# Patient Record
Sex: MALE | Race: WHITE | URBAN - METROPOLITAN AREA
[De-identification: names, ages, dates, MRNs, and addresses within clinical notes are randomized per-mention and may not be internally consistent; named-entity substitution may affect disease eponyms.]

---

## 2017-06-14 ENCOUNTER — TELEPHONE (OUTPATIENT)
Dept: FAMILY MEDICINE | Facility: CLINIC | Age: 42
End: 2017-06-14

## 2017-06-14 NOTE — TELEPHONE ENCOUNTER
Lm on vm telling patient needs to follow up with his diabetes with Dr Wegener he is due and to call clinic asap to get that scheduled

## 2017-08-14 ENCOUNTER — OFFICE VISIT (OUTPATIENT)
Dept: FAMILY MEDICINE | Facility: CLINIC | Age: 42
End: 2017-08-14
Payer: COMMERCIAL

## 2017-08-14 ENCOUNTER — HOSPITAL ENCOUNTER (EMERGENCY)
Facility: CLINIC | Age: 42
Discharge: HOME | End: 2017-08-14
Attending: EMERGENCY MEDICINE | Admitting: EMERGENCY MEDICINE
Payer: COMMERCIAL

## 2017-08-14 ENCOUNTER — APPOINTMENT (OUTPATIENT)
Dept: CT IMAGING | Facility: CLINIC | Age: 42
End: 2017-08-14
Attending: EMERGENCY MEDICINE
Payer: COMMERCIAL

## 2017-08-14 VITALS
WEIGHT: 225.25 LBS | TEMPERATURE: 98.6 F | BODY MASS INDEX: 36.25 KG/M2 | SYSTOLIC BLOOD PRESSURE: 136 MMHG | HEART RATE: 66 BPM | OXYGEN SATURATION: 97 % | DIASTOLIC BLOOD PRESSURE: 86 MMHG

## 2017-08-14 VITALS
BODY MASS INDEX: 33.95 KG/M2 | OXYGEN SATURATION: 95 % | SYSTOLIC BLOOD PRESSURE: 151 MMHG | WEIGHT: 224 LBS | TEMPERATURE: 99 F | HEIGHT: 68 IN | DIASTOLIC BLOOD PRESSURE: 85 MMHG | RESPIRATION RATE: 20 BRPM | HEART RATE: 92 BPM

## 2017-08-14 DIAGNOSIS — R10.32 ABDOMINAL PAIN, LEFT LOWER QUADRANT: Primary | ICD-10-CM

## 2017-08-14 DIAGNOSIS — R73.9 HYPERGLYCEMIA: ICD-10-CM

## 2017-08-14 DIAGNOSIS — E66.01 MORBID OBESITY, UNSPECIFIED OBESITY TYPE (H): ICD-10-CM

## 2017-08-14 DIAGNOSIS — D72.829 LEUKOCYTOSIS, UNSPECIFIED TYPE: ICD-10-CM

## 2017-08-14 DIAGNOSIS — R79.89 ELEVATED LACTIC ACID LEVEL: ICD-10-CM

## 2017-08-14 DIAGNOSIS — E11.9 TYPE 2 DIABETES MELLITUS WITHOUT COMPLICATION, WITHOUT LONG-TERM CURRENT USE OF INSULIN (H): ICD-10-CM

## 2017-08-14 DIAGNOSIS — N20.0 KIDNEY STONE: ICD-10-CM

## 2017-08-14 DIAGNOSIS — R31.29 MICROSCOPIC HEMATURIA: ICD-10-CM

## 2017-08-14 DIAGNOSIS — R11.2 NAUSEA AND VOMITING, INTRACTABILITY OF VOMITING NOT SPECIFIED, UNSPECIFIED VOMITING TYPE: ICD-10-CM

## 2017-08-14 LAB
ALBUMIN SERPL-MCNC: 4.1 G/DL (ref 3.4–5)
ALBUMIN UR-MCNC: 30 MG/DL
ALBUMIN UR-MCNC: NEGATIVE MG/DL
ALP SERPL-CCNC: 66 U/L (ref 40–150)
ALT SERPL W P-5'-P-CCNC: 43 U/L (ref 0–70)
ANION GAP SERPL CALCULATED.3IONS-SCNC: 14 MMOL/L (ref 3–14)
APPEARANCE UR: CLEAR
APPEARANCE UR: CLEAR
AST SERPL W P-5'-P-CCNC: 27 U/L (ref 0–45)
BACTERIA #/AREA URNS HPF: ABNORMAL /HPF
BASOPHILS # BLD AUTO: 0 10E9/L (ref 0–0.2)
BASOPHILS # BLD AUTO: 0 10E9/L (ref 0–0.2)
BASOPHILS NFR BLD AUTO: 0.1 %
BASOPHILS NFR BLD AUTO: 0.2 %
BILIRUB SERPL-MCNC: 1.5 MG/DL (ref 0.2–1.3)
BILIRUB UR QL STRIP: NEGATIVE
BILIRUB UR QL STRIP: NEGATIVE
BUN SERPL-MCNC: 17 MG/DL (ref 7–30)
CALCIUM SERPL-MCNC: 9.2 MG/DL (ref 8.5–10.1)
CHLORIDE SERPL-SCNC: 102 MMOL/L (ref 94–109)
CO2 SERPL-SCNC: 20 MMOL/L (ref 20–32)
COLOR UR AUTO: YELLOW
COLOR UR AUTO: YELLOW
CREAT SERPL-MCNC: 1.13 MG/DL (ref 0.66–1.25)
DIFFERENTIAL METHOD BLD: ABNORMAL
DIFFERENTIAL METHOD BLD: ABNORMAL
EOSINOPHIL # BLD AUTO: 0 10E9/L (ref 0–0.7)
EOSINOPHIL # BLD AUTO: 0 10E9/L (ref 0–0.7)
EOSINOPHIL NFR BLD AUTO: 0 %
EOSINOPHIL NFR BLD AUTO: 0.1 %
ERYTHROCYTE [DISTWIDTH] IN BLOOD BY AUTOMATED COUNT: 12.4 % (ref 10–15)
ERYTHROCYTE [DISTWIDTH] IN BLOOD BY AUTOMATED COUNT: 12.7 % (ref 10–15)
GFR SERPL CREATININE-BSD FRML MDRD: 70 ML/MIN/1.7M2
GLUCOSE BLD-MCNC: 226 MG/DL (ref 70–99)
GLUCOSE SERPL-MCNC: 230 MG/DL (ref 70–99)
GLUCOSE UR STRIP-MCNC: 500 MG/DL
GLUCOSE UR STRIP-MCNC: 500 MG/DL
HBA1C MFR BLD: 7.7 % (ref 4.3–6)
HCT VFR BLD AUTO: 44.2 % (ref 40–53)
HCT VFR BLD AUTO: 44.9 % (ref 40–53)
HGB BLD-MCNC: 16.1 G/DL (ref 13.3–17.7)
HGB BLD-MCNC: 16.3 G/DL (ref 13.3–17.7)
HGB UR QL STRIP: ABNORMAL
HGB UR QL STRIP: ABNORMAL
IMM GRANULOCYTES # BLD: 0 10E9/L (ref 0–0.4)
IMM GRANULOCYTES NFR BLD: 0.3 %
KETONES UR STRIP-MCNC: 40 MG/DL
KETONES UR STRIP-MCNC: 80 MG/DL
LACTATE BLD-SCNC: 2.1 MMOL/L (ref 0.7–2.1)
LACTATE BLD-SCNC: 2.6 MMOL/L (ref 0.7–2.1)
LEUKOCYTE ESTERASE UR QL STRIP: NEGATIVE
LEUKOCYTE ESTERASE UR QL STRIP: NEGATIVE
LIPASE SERPL-CCNC: 110 U/L (ref 73–393)
LYMPHOCYTES # BLD AUTO: 0.7 10E9/L (ref 0.8–5.3)
LYMPHOCYTES # BLD AUTO: 0.8 10E9/L (ref 0.8–5.3)
LYMPHOCYTES NFR BLD AUTO: 5.2 %
LYMPHOCYTES NFR BLD AUTO: 5.7 %
MCH RBC QN AUTO: 30.4 PG (ref 26.5–33)
MCH RBC QN AUTO: 31.3 PG (ref 26.5–33)
MCHC RBC AUTO-ENTMCNC: 35.9 G/DL (ref 31.5–36.5)
MCHC RBC AUTO-ENTMCNC: 36.9 G/DL (ref 31.5–36.5)
MCV RBC AUTO: 85 FL (ref 78–100)
MCV RBC AUTO: 85 FL (ref 78–100)
MONOCYTES # BLD AUTO: 0.4 10E9/L (ref 0–1.3)
MONOCYTES # BLD AUTO: 0.5 10E9/L (ref 0–1.3)
MONOCYTES NFR BLD AUTO: 3.1 %
MONOCYTES NFR BLD AUTO: 3.7 %
NEUTROPHILS # BLD AUTO: 11.6 10E9/L (ref 1.6–8.3)
NEUTROPHILS # BLD AUTO: 13.3 10E9/L (ref 1.6–8.3)
NEUTROPHILS NFR BLD AUTO: 90.7 %
NEUTROPHILS NFR BLD AUTO: 90.9 %
NITRATE UR QL: NEGATIVE
NITRATE UR QL: NEGATIVE
NRBC # BLD AUTO: 0 10*3/UL
NRBC BLD AUTO-RTO: 0 /100
PH UR STRIP: 5.5 PH (ref 5–7)
PH UR STRIP: 6 PH (ref 5–7)
PLATELET # BLD AUTO: 134 10E9/L (ref 150–450)
PLATELET # BLD AUTO: 136 10E9/L (ref 150–450)
POTASSIUM SERPL-SCNC: 4.2 MMOL/L (ref 3.4–5.3)
PROT SERPL-MCNC: 8 G/DL (ref 6.8–8.8)
RBC # BLD AUTO: 5.21 10E12/L (ref 4.4–5.9)
RBC # BLD AUTO: 5.3 10E12/L (ref 4.4–5.9)
RBC #/AREA URNS AUTO: ABNORMAL /HPF (ref 0–2)
RBC #/AREA URNS AUTO: ABNORMAL /HPF (ref 0–2)
SODIUM SERPL-SCNC: 136 MMOL/L (ref 133–144)
SP GR UR STRIP: 1.02 (ref 1–1.03)
SP GR UR STRIP: 1.02 (ref 1–1.03)
URN SPEC COLLECT METH UR: ABNORMAL
URN SPEC COLLECT METH UR: ABNORMAL
UROBILINOGEN UR STRIP-ACNC: 0.2 EU/DL (ref 0.2–1)
UROBILINOGEN UR STRIP-ACNC: 0.2 EU/DL (ref 0.2–1)
WBC # BLD AUTO: 12.8 10E9/L (ref 4–11)
WBC # BLD AUTO: 14.7 10E9/L (ref 4–11)
WBC #/AREA URNS AUTO: ABNORMAL /HPF (ref 0–2)
WBC #/AREA URNS AUTO: ABNORMAL /HPF (ref 0–2)

## 2017-08-14 PROCEDURE — 25000128 H RX IP 250 OP 636: Performed by: EMERGENCY MEDICINE

## 2017-08-14 PROCEDURE — 85025 COMPLETE CBC W/AUTO DIFF WBC: CPT | Performed by: EMERGENCY MEDICINE

## 2017-08-14 PROCEDURE — 80053 COMPREHEN METABOLIC PANEL: CPT | Performed by: EMERGENCY MEDICINE

## 2017-08-14 PROCEDURE — 36415 COLL VENOUS BLD VENIPUNCTURE: CPT

## 2017-08-14 PROCEDURE — 87040 BLOOD CULTURE FOR BACTERIA: CPT | Performed by: EMERGENCY MEDICINE

## 2017-08-14 PROCEDURE — 96375 TX/PRO/DX INJ NEW DRUG ADDON: CPT

## 2017-08-14 PROCEDURE — 81001 URINALYSIS AUTO W/SCOPE: CPT | Performed by: FAMILY MEDICINE

## 2017-08-14 PROCEDURE — 99207 ZZC OFFICE-HOSPITAL ADMIT: CPT | Performed by: FAMILY MEDICINE

## 2017-08-14 PROCEDURE — 83690 ASSAY OF LIPASE: CPT | Performed by: EMERGENCY MEDICINE

## 2017-08-14 PROCEDURE — 96374 THER/PROPH/DIAG INJ IV PUSH: CPT

## 2017-08-14 PROCEDURE — 74176 CT ABD & PELVIS W/O CONTRAST: CPT

## 2017-08-14 PROCEDURE — 83605 ASSAY OF LACTIC ACID: CPT | Performed by: EMERGENCY MEDICINE

## 2017-08-14 PROCEDURE — 85025 COMPLETE CBC W/AUTO DIFF WBC: CPT | Performed by: FAMILY MEDICINE

## 2017-08-14 PROCEDURE — 81001 URINALYSIS AUTO W/SCOPE: CPT | Performed by: EMERGENCY MEDICINE

## 2017-08-14 PROCEDURE — 82947 ASSAY GLUCOSE BLOOD QUANT: CPT | Performed by: FAMILY MEDICINE

## 2017-08-14 PROCEDURE — 99285 EMERGENCY DEPT VISIT HI MDM: CPT | Mod: 25

## 2017-08-14 PROCEDURE — 96361 HYDRATE IV INFUSION ADD-ON: CPT

## 2017-08-14 PROCEDURE — 36415 COLL VENOUS BLD VENIPUNCTURE: CPT | Performed by: FAMILY MEDICINE

## 2017-08-14 PROCEDURE — 83036 HEMOGLOBIN GLYCOSYLATED A1C: CPT | Performed by: FAMILY MEDICINE

## 2017-08-14 RX ORDER — TAMSULOSIN HYDROCHLORIDE 0.4 MG/1
0.4 CAPSULE ORAL DAILY
Qty: 7 CAPSULE | Refills: 0 | Status: SHIPPED | OUTPATIENT
Start: 2017-08-14 | End: 2017-08-21

## 2017-08-14 RX ORDER — KETOROLAC TROMETHAMINE 30 MG/ML
30 INJECTION, SOLUTION INTRAMUSCULAR; INTRAVENOUS ONCE
Status: COMPLETED | OUTPATIENT
Start: 2017-08-14 | End: 2017-08-14

## 2017-08-14 RX ORDER — OXYCODONE AND ACETAMINOPHEN 5; 325 MG/1; MG/1
1 TABLET ORAL EVERY 4 HOURS PRN
Qty: 12 TABLET | Refills: 0 | Status: SHIPPED | OUTPATIENT
Start: 2017-08-14 | End: 2017-11-28

## 2017-08-14 RX ORDER — ONDANSETRON 4 MG/1
4 TABLET, ORALLY DISINTEGRATING ORAL EVERY 8 HOURS PRN
Qty: 12 TABLET | Refills: 0 | Status: SHIPPED | OUTPATIENT
Start: 2017-08-14 | End: 2017-08-17

## 2017-08-14 RX ORDER — ONDANSETRON 2 MG/ML
4 INJECTION INTRAMUSCULAR; INTRAVENOUS ONCE
Status: COMPLETED | OUTPATIENT
Start: 2017-08-14 | End: 2017-08-14

## 2017-08-14 RX ORDER — SODIUM CHLORIDE 9 MG/ML
1000 INJECTION, SOLUTION INTRAVENOUS CONTINUOUS
Status: DISCONTINUED | OUTPATIENT
Start: 2017-08-14 | End: 2017-08-15 | Stop reason: HOSPADM

## 2017-08-14 RX ADMIN — SODIUM CHLORIDE 1000 ML: 9 INJECTION, SOLUTION INTRAVENOUS at 19:27

## 2017-08-14 RX ADMIN — ONDANSETRON 4 MG: 2 SOLUTION INTRAMUSCULAR; INTRAVENOUS at 20:55

## 2017-08-14 RX ADMIN — KETOROLAC TROMETHAMINE 30 MG: 30 INJECTION, SOLUTION INTRAMUSCULAR at 21:23

## 2017-08-14 RX ADMIN — SODIUM CHLORIDE 1000 ML: 9 INJECTION, SOLUTION INTRAVENOUS at 20:22

## 2017-08-14 ASSESSMENT — ENCOUNTER SYMPTOMS
ABDOMINAL PAIN: 1
DYSURIA: 0
VOMITING: 1
DIARRHEA: 0
NAUSEA: 1
FEVER: 0
FREQUENCY: 1
HEMATURIA: 0

## 2017-08-14 NOTE — MR AVS SNAPSHOT
"              After Visit Summary   8/14/2017    Sha Lopes    MRN: [id number]           Patient Information     Date Of Birth          1975        Visit Information        Provider Department      8/14/2017 4:40 PM Kesha Larkin MD [facility]        Today's Diagnoses     Abdominal pain, left lower quadrant    -  1    Type 2 diabetes mellitus without complication, without long-term current use of insulin (H)        BMI >35 with comorbidity DM        Hyperglycemia        Nausea and vomiting, intractability of vomiting not specified, unspecified vomiting type        Microscopic hematuria           Follow-ups after your visit        Who to contact     If you have questions or need follow up information about today's clinic visit or your schedule please contact [facility] directly at [phone].  Normal or non-critical lab and imaging results will be communicated to you by MyChart, letter or phone within 4 business days after the clinic has received the results. If you do not hear from us within 7 days, please contact the clinic through MyChart or phone. If you have a critical or abnormal lab result, we will notify you by phone as soon as possible.  Submit refill requests through [email or web address] or call your pharmacy and they will forward the refill request to us. Please allow 3 business days for your refill to be completed.          Additional Information About Your Visit        [employer] Information     [email or web address] lets you send messages to your doctor, view your test results, renew your prescriptions, schedule appointments and more. To sign up, go to [email or web address] . Click on \"Log in\" on the left side of the screen, which will take you to the Welcome page. Then click on \"Sign up Now\" on the right side of the page.     You will be asked to enter the access code listed below, as well as some personal information. Please follow the directions to create your username and " password.     Your access code is: [email or web address]  Expires: 2017  9:56 PM     Your access code will  in 90 days. If you need help or a new code, please call your [place] clinic or [phone].        Care EveryWhere ID     This is your Care EveryWhere ID. This could be used by other organizations to access your [place] medical records  [id number]        Your Vitals Were     Pulse Temperature Pulse Oximetry BMI (Body Mass Index)          66 98.6  F (37  C) (Tympanic) 97% 36.25 kg/m2         Blood Pressure from Last 3 Encounters:   17 151/85   17 136/86   10/14/16 136/88    Weight from Last 3 Encounters:   17 224 lb (101.6 kg)   17 225 lb 4 oz (102.2 kg)   10/14/16 221 lb (100.2 kg)              We Performed the Following     *UA reflex to Microscopic and Culture ([place] and [facility] (except [place] and [place])     CBC with platelets differential     Glucose, whole blood     Hemoglobin A1c     Urine Microscopic        Primary Care Provider Office Phone # Fax #    Joel Daniel Irwin Wegener, [phone] [address] [place] [address]        Equal Access to Services     JL RODRIGUEZ : Hadii aad ku hadasho Soomaali, waaxda luqadaha, qaybta kaalmada adeegyada, waxay idiin hayaan bassam anderson . So [facility] [place] [phone].    ATENCIÓN: Si habla español, tiene a issa disposición servicios gratuitos de asistencia lingüística. ame al [phone].    We comply with applicable federal civil rights laws and [place] laws. We do not discriminate on the basis of race, color, national origin, age, disability sex, sexual orientation or gender identity.            Thank you!     Thank you for choosing [facility]  for your care. Our goal is always to provide you with excellent care. Hearing back from our patients is one way we can continue to improve our services. Please take a few minutes to complete the written survey that you may receive  in the mail after your visit with us. Thank you!             Your Updated Medication List - Protect others around you: Learn how to safely use, store and throw away your medicines at www.disposemymeds.org.          This list is accurate as of: 8/14/17 11:59 PM.  Always use your most recent med list.                   Brand Name Dispense Instructions for use Diagnosis    blood glucose monitoring test strip    no brand specified    100 each    One touch verio test strips Use to test blood sugars two times daily or as directed    Type 2 diabetes mellitus with hyperglycemia, without long-term current use of insulin (H)       lisinopril 5 MG tablet    PRINIVIL/ZESTRIL    90 tablet    Take 1 tablet (5 mg) by mouth daily    Hyperlipidemia LDL goal <100, Type 2 diabetes mellitus with hyperglycemia, without long-term current use of insulin (H)       metFORMIN 1000 MG tablet    GLUCOPHAGE    180 tablet    Take 1 tablet (1,000 mg) by mouth 2 times daily (with meals)    Type 2 diabetes mellitus with hyperglycemia, without long-term current use of insulin (H), Hyperlipidemia LDL goal <100       Multi-vitamin Tabs tablet     100 tablet    Take 1 tablet by mouth daily        ondansetron 4 MG ODT tab    ZOFRAN ODT    12 tablet    Take 1 tablet (4 mg) by mouth every 8 hours as needed for nausea        oxyCODONE-acetaminophen 5-325 MG per tablet    PERCOCET    12 tablet    Take 1 tablet by mouth every 4 hours as needed for pain        pseudoePHEDrine 30 MG tablet    SUDAFED    112 tablet    Take by mouth every 4 hours as needed for congestion        tamsulosin 0.4 MG capsule    FLOMAX    7 capsule    Take 1 capsule (0.4 mg) by mouth daily for 7 doses

## 2017-08-14 NOTE — ED AVS SNAPSHOT
"  Emergency Department    [address] [facility] [address]    Phone:  [phone]    Fax:  [phone]                                       Sha Lopes   MRN: [id number]    Department:   Emergency Department   Date of Visit:  8/14/2017           Patient Information     Date Of Birth          1975        Your diagnoses for this visit were:     Kidney stone     Leukocytosis, unspecified type     Elevated lactic acid level        You were seen by Dk Bryson DO.      Follow-up Information     Follow up with Miguel A Nesbitt MD In 2 days.    Specialty:  Urology    Contact information:    [facility]  [address] [facility] [address]     [facility] [address] [phone]          Follow up with  Emergency Department.    Specialty:  EMERGENCY MEDICINE    Why:  If symptoms worsen    Contact information:    [address] [facility] [address] [phone]        Discharge Instructions          * KIDNEY STONE (w/ Colic)    The sharp cramping pain and nausea/vomiting that you have is due to a small stone which has formed in the kidney and is now passing down a narrow tube (ureter) on its way to your bladder. Once it reaches your bladder, the pain will stop. The stone may pass in your urine stream in one piece. [The size may be 1/16\" to 1/4\" (1-6mm)]. Or, the stone may also break up into rebeca fragments which you may not even notice.  Once you have had a kidney stone there is a risk for recurrence in the future.  HOME CARE:    Drink lots of fluid (at least 8-10 glasses of water a day).    Most stones will pass on their own, but may take from a few hours to a few days.    Each time you urinate, do so in a jar. Pour the urine from the jar through the strainer and into the toilet. Continue doing this until 24 hours after your pain stops. By then, if there was a kidney stone, it should pass from your bladder. Some stones dissolve into sand-like particles and " pass right through the strainer. In that case, you won't ever see a stone.    Save any stone that you find in the strainer and bring it to your doctor for analysis. It may be possible to prevent certain types of stones from forming. Therefore, it is important to know what kind of stone you have.    Try to stay as active as possible since this will help the stone pass. Do not stay in bed unless your pain prevents you from getting up. You may notice a red, pink or brown color to your urine. This is normal while passing a kidney stone.  FOLLOW UP with your doctor or return to this facility if the pain lasts more than 48 hours.  GET PROMPT MEDICAL ATTENTION if any of the following occur:    Pain that is not controlled by the medicine given    Repeated vomiting or unable to keep down fluids    Weakness, dizziness or fainting    Fever over 101  F (38.3  C)    Passage of solid red or brown urine (can't see through it) or urine with lots of blood clots    Unable to pass urine for 8 hours and increasing bladder pressure    [address]38[address] [place] [address]. All rights reserved. This information is not intended as a substitute for professional medical care. Always follow your healthcare professional's instructions.      24 Hour Appointment Hotline       To make an appointment at any [facility] clinic, call [phone] ([phone]). If you don't have a family doctor or clinic, we will help you find one. [facility] clinics are conveniently located to serve the needs of you and your family.             Review of your medicines      START taking        Dose / Directions Last dose taken    oxyCODONE-acetaminophen 5-325 MG per tablet   Commonly known as:  PERCOCET   Dose:  1 tablet   Quantity:  12 tablet        Take 1 tablet by mouth every 4 hours as needed for pain   Refills:  0        tamsulosin 0.4 MG capsule   Commonly known as:  FLOMAX   Dose:  0.4 mg   Quantity:  7 capsule        Take 1  capsule (0.4 mg) by mouth daily for 7 doses   Refills:  0          Our records show that you are taking the medicines listed below. If these are incorrect, please call your family doctor or clinic.        Dose / Directions Last dose taken    blood glucose monitoring test strip   Commonly known as:  no brand specified   Quantity:  100 each        One touch verio test strips Use to test blood sugars two times daily or as directed   Refills:  11        lisinopril 5 MG tablet   Commonly known as:  PRINIVIL/ZESTRIL   Dose:  5 mg   Quantity:  90 tablet        Take 1 tablet (5 mg) by mouth daily   Refills:  3        metFORMIN 1000 MG tablet   Commonly known as:  GLUCOPHAGE   Dose:  1000 mg   Quantity:  180 tablet        Take 1 tablet (1,000 mg) by mouth 2 times daily (with meals)   Refills:  3        Multi-vitamin Tabs tablet   Dose:  1 tablet   Quantity:  100 tablet        Take 1 tablet by mouth daily   Refills:  3        pseudoePHEDrine 30 MG tablet   Commonly known as:  SUDAFED   Quantity:  112 tablet        Take by mouth every 4 hours as needed for congestion   Refills:  0                Prescriptions were sent or printed at these locations (2 Prescriptions)                   Other Prescriptions                Printed at Department/Unit printer (2 of 2)         oxyCODONE-acetaminophen (PERCOCET) 5-325 MG per tablet               tamsulosin (FLOMAX) 0.4 MG capsule                Procedures and tests performed during your visit     Procedure/Test Number of Times Performed    *UA reflex to Microscopic (ED Lab POCT Only 3-11) 1    Blood culture 2    CBC with platelets differential 1    CT Abdomen Pelvis w/o Contrast (stone protocol) 1    Comprehensive metabolic panel 1    Lactic acid 1    Lactic acid whole blood 1    Lipase 1    Urine Microscopic 1      Orders Needing Specimen Collection     None      Pending Results     Date and Time Order Name Status Description    8/14/2017 1955 Blood culture In process     8/14/2017  1955 Blood culture In process             Pending Culture Results     Date and Time Order Name Status Description    8/14/2017 1955 Blood culture In process     8/14/2017 1955 Blood culture In process             Pending Results Instructions     If you had any lab results that were not finalized at the time of your Discharge, you can call the ED Lab Result RN at [phone]. You will be contacted by this team for any positive Lab results or changes in treatment. The nurses are available 7 days a week from 10A to 6:30P.  You can leave a message 24 hours per day and they will return your call.        Test Results From Your Hospital Stay        8/14/2017  7:29 PM      Component Results     Component Value Ref Range & Units Status    WBC 14.7 (H) 4.0 - 11.0 10e9/L Final    RBC Count 5.21 4.4 - 5.9 10e12/L Final    Hemoglobin 16.3 13.3 - 17.7 g/dL Final    Hematocrit 44.2 40.0 - 53.0 % Final    MCV 85 78 - 100 fl Final    MCH 31.3 26.5 - 33.0 pg Final    MCHC 36.9 (H) 31.5 - 36.5 g/dL Final    RDW 12.4 10.0 - 15.0 % Final    Platelet Count 136 (L) 150 - 450 10e9/L Final    Diff Method Automated Method  Final    % Neutrophils 90.7 % Final    % Lymphocytes 5.2 % Final    % Monocytes 3.7 % Final    % Eosinophils 0.0 % Final    % Basophils 0.1 % Final    % Immature Granulocytes 0.3 % Final    Nucleated RBCs 0 0 /100 Final    Absolute Neutrophil 13.3 (H) 1.6 - 8.3 10e9/L Final    Absolute Lymphocytes 0.8 0.8 - 5.3 10e9/L Final    Absolute Monocytes 0.5 0.0 - 1.3 10e9/L Final    Absolute Eosinophils 0.0 0.0 - 0.7 10e9/L Final    Absolute Basophils 0.0 0.0 - 0.2 10e9/L Final    Abs Immature Granulocytes 0.0 0 - 0.4 10e9/L Final    Absolute Nucleated RBC 0.0  Final         8/14/2017  7:58 PM      Component Results     Component Value Ref Range & Units Status    Sodium 136 133 - 144 mmol/L Final    Potassium 4.2 3.4 - 5.3 mmol/L Final    Chloride 102 94 - 109 mmol/L Final    Carbon Dioxide 20 20 - 32 mmol/L Final    Anion Gap 14  3 - 14 mmol/L Final    Glucose 230 (H) 70 - 99 mg/dL Final    Urea Nitrogen 17 7 - 30 mg/dL Final    Creatinine 1.13 0.66 - 1.25 mg/dL Final    GFR Estimate 70 >60 mL/min/1.7m2 Final    Non  GFR Calc    GFR Estimate If Black 85 >60 mL/min/1.7m2 Final    African American GFR Calc    Calcium 9.2 8.5 - 10.1 mg/dL Final    Bilirubin Total 1.5 (H) 0.2 - 1.3 mg/dL Final    Albumin 4.1 3.4 - 5.0 g/dL Final    Protein Total 8.0 6.8 - 8.8 g/dL Final    Alkaline Phosphatase 66 40 - 150 U/L Final    ALT 43 0 - 70 U/L Final    AST 27 0 - 45 U/L Final         8/14/2017  7:43 PM      Component Results     Component Value Ref Range & Units Status    Lipase 110 73 - 393 U/L Final         8/14/2017  7:36 PM      Component Results     Component Value Ref Range & Units Status    Lactic Acid 2.6 (H) 0.7 - 2.1 mmol/L Final         8/14/2017  8:05 PM      Component Results     Component Value Ref Range & Units Status    Color Urine Yellow  Final    Appearance Urine Clear  Final    Glucose Urine 500 (A) NEG mg/dL Final    Bilirubin Urine Negative NEG Final    Ketones Urine 80 (A) NEG mg/dL Final    Specific Gravity Urine 1.025 1.003 - 1.035 Final    Blood Urine Large (A) NEG Final    pH Urine 5.5 5.0 - 7.0 pH Final    Protein Albumin Urine Negative NEG mg/dL Final    Urobilinogen Urine 0.2 0.2 - 1.0 EU/dL Final    Nitrite Urine Negative NEG Final    Leukocyte Esterase Urine Negative NEG Final    Source Midstream Urine  Final         8/14/2017  8:33 PM         8/14/2017  8:31 PM         8/14/2017  8:05 PM      Component Results     Component Value Ref Range & Units Status    WBC Urine O - 2 0 - 2 /HPF Final    RBC Urine 5-10 (A) 0 - 2 /HPF Final         8/14/2017  8:51 PM      Narrative     Exam: CT ABDOMEN PELVIS W/O CONTRAST  8/14/2017 8:44 PM    History: Abdominal or flank pain, possible stone.    Comparison: None    Technique: Volumetric acquisition with reconstruction in the axial,  coronal planes through the abdomen  and pelvis without contrast.  Radiation dose for this scan was reduced using automated exposure  control, adjustment of the mA and/or kV according to patient size, or  iterative reconstruction technique.    Contrast: None    Findings:   Lung Bases: Mild dependent atelectasis. No pleural or pericardial  effusion.    Abdomen: Unenhanced liver, spleen, adrenal glands, pancreas are  normal. Layering sludge in the gallbladder without gallbladder wall  thickening or biliary dilatation.    Normal appearance of the right kidney without renal or ureteral  stones, no hydronephrosis or hydroureter. Left side demonstrates mild  to moderate hydronephrosis and hydroureter. There is a 2 mm  calcification that appears to be within the urinary bladder, but may  be within a ureterocele as there is hypodensity extending from the  ureterovesical junction to the level of the stone. Additionally, there  is a 5 mm stone in the distal left ureter, approximately 1-2 cm from  the ureterovesical junction.    No areas of bowel wall thickening or bowel dilatation. Scattered  colonic diverticula without signs of diverticulitis. Normal appendix.  No free fluid. No abdominal or pelvic lymphadenopathy.    Bones: No concerning lytic or sclerotic lesions.        Impression     Impression: 2 mm stone likely within a left ureterocele. Additional 5  mm stone in the distal left ureter approximately 1-2 cm from the  ureterovesical junction. There is mild to moderate associated upstream  hydronephrosis and hydroureter.    CORINNA COSTA         8/14/2017  9:38 PM      Component Results     Component Value Ref Range & Units Status    Lactic Acid 2.1 0.7 - 2.1 mmol/L Final                Clinical Quality Measure: Blood Pressure Screening     Your blood pressure was checked while you were in the emergency department today. The last reading we obtained was  BP: (!) 169/96 . Please read the guidelines below about what these numbers mean and what you should do  "about them.  If your systolic blood pressure (the top number) is less than 120 and your diastolic blood pressure (the bottom number) is less than 80, then your blood pressure is normal. There is nothing more that you need to do about it.  If your systolic blood pressure (the top number) is 120-139 or your diastolic blood pressure (the bottom number) is 80-89, your blood pressure may be higher than it should be. You should have your blood pressure rechecked within a year by a primary care provider.  If your systolic blood pressure (the top number) is 140 or greater or your diastolic blood pressure (the bottom number) is 90 or greater, you may have high blood pressure. High blood pressure is treatable, but if left untreated over time it can put you at risk for heart attack, stroke, or kidney failure. You should have your blood pressure rechecked by a primary care provider within the next 4 weeks.  If your provider in the emergency department today gave you specific instructions to follow-up with your doctor or provider even sooner than that, you should follow that instruction and not wait for up to 4 weeks for your follow-up visit.        Thank you for choosing [facility]       Thank you for choosing [facility] for your care. Our goal is always to provide you with excellent care. Hearing back from our patients is one way we can continue to improve our services. Please take a few minutes to complete the written survey that you may receive in the mail after you visit with us. Thank you!        [employer] Information     [employer] lets you send messages to your doctor, view your test results, renew your prescriptions, schedule appointments and more. To sign up, go to [email or web address] . Click on \"Log in\" on the left side of the screen, which will take you to the Welcome page. Then click on \"Sign up Now\" on the right side of the page.     You will be asked to enter the access code listed below, as well as some personal " information. Please follow the directions to create your username and password.     Your access code is: [email or web address]  Expires: 2017  9:56 PM     Your access code will  in 90 days. If you need help or a new code, please call your [facility] clinic or [phone].        Care EveryWhere ID     This is your Care EveryWhere ID. This could be used by other organizations to access your [facility] medical records  [id number]        Equal Access to Services     [facility]red HospitalJOSAFAT : Angelina lino [place], waaxda luqadaha, qaybta kaalmada adejessy, prachi anderson . So [facility] [phone].    ATENCIÓN: Si habla español, tiene a issa disposición servicios gratuitos de asistencia lingüística. Llame al [phone].    We comply with applicable federal civil rights laws and [place] laws. We do not discriminate on the basis of race, color, national origin, age, disability sex, sexual orientation or gender identity.            After Visit Summary       This is your record. Keep this with you and show to your community pharmacist(s) and doctor(s) at your next visit.

## 2017-08-14 NOTE — NURSING NOTE
Writer called [facility] ER per Dr. Larkin's request. Dr. Larkin sending patient to ER for further evaluation. Writer updated ER to patient current condition. Patient with DMII, has not taken Metformin for one month. Patient with LLQ pain, Nausea and vomiting, Hematuria, WBC, RBC and ketones in urine as well. Vitals WNL. Glucose level processing but Hyperglycemia suspected.   ER staff updated to all information and patient being driven to ER by family/friend.     Thanks! Lennie Carbone RN

## 2017-08-14 NOTE — PROGRESS NOTES
SUBJECTIVE:                                                    Sha Lopes is a 42 year old male who presents to clinic today for the following health issues:    ABDOMINAL   PAIN     Onset: today     Description:   Character: Gnawing pain, eases off but comes back after urinating   Location: left lower quadrant  Radiation: None     Intensity: severe    Progression of Symptoms:  worsening    Accompanying Signs & Symptoms:  Fever/Chills?: YES- chills and warm  Gas/Bloating: no   Nausea: YES  Vomitting: YES  Diarrhea?: no   Constipation:no       History:   Trauma: no   Previous similar pain: YES- long ago    Previous tests done: x-ray    Precipitating factors:   Does the pain change with:     Food: no      BM: no     Urination: YES    Alleviating factors:  none    Therapies Tried and outcome: metformin and aspirin outcome; not effective     LMP:  not applicable     Genitourinary - Male  Onset: today     Description:   Dysuria (painful urination): YES  Hematuria (blood in urine): no   Frequency: YES  Are you urinating at night : YES- 1x  Hesitancy (delay in urine): no   Retention (unable to empty): no   Decrease in urinary flow: no   Incontinence: no     Progression of Symptoms:  worsening    Accompanying Signs & Symptoms:     Back/Flank pain: no   Urethral discharge: no   Testicle lumps/masses/pain: no           History:   History of frequent UTI's: no   History of kidney stones: no   History of hernias: no   Personal or Family history of Prostate problems: no  Sexually active: no     Precipitating factors:   none    Alleviating factors:  none        He complains of pain in his left lower quad when he urinates.     He last checked his sugars months ago, His last dose of Metformin was today. It was sitting there and he noticed it and decided to take it.     He has been taking Metformin, but he has missed it many times. He has only taken it 4-5 times in the last few weeks. He has been really stressed with a  recent move back here entirely. He finished a new masters and started a new job. He has been stress eating and not paying attention to his diet.    When he woke up this morning he complained of abdominal pain     He will urinate once in the night. If he hloed his urine in he will get some lower left abdominal discomfort that resolves with urination.    Today, the left lower abdominal pain persisted and got progressively worse while he was at work. He will urinate and it had led to pain centered in the lower left side. When he stops urinating it radiates out into surrounding areas and the contracts back in.     He has had nausea, chills, hot flashes. He then vomited and urinated which causes the worst pain.     In the clinic, he is still in some pain.     No increased hunger or thirst. No leg swelling     He has had hematuria in the past once.     He had a recent stress test which came back negative.       Problem list and histories reviewed & adjusted, as indicated.  Additional history: as documented    Patient Active Problem List   Diagnosis     Family history of diabetes mellitus     Type 2 diabetes, HbA1c goal < 7% (H)     BMI >35 with comorbidity DM     History reviewed. No pertinent surgical history.    Social History   Substance Use Topics     Smoking status: Never Smoker     Smokeless tobacco: Never Used     Alcohol use No     History reviewed. No pertinent family history.      Current Outpatient Prescriptions   Medication Sig Dispense Refill     metFORMIN (GLUCOPHAGE) 1000 MG tablet Take 1 tablet (1,000 mg) by mouth 2 times daily (with meals) 180 tablet 3     blood glucose monitoring (NO BRAND SPECIFIED) test strip One touch verio test strips Use to test blood sugars two times daily or as directed 100 each 11     multivitamin, therapeutic with minerals (MULTI-VITAMIN) TABS Take 1 tablet by mouth daily 100 tablet 3     lisinopril (PRINIVIL,ZESTRIL) 5 MG tablet Take 1 tablet (5 mg) by mouth daily (Patient not  taking: Reported on 8/14/2017) 90 tablet 3     pseudoePHEDrine (SUDAFED) 30 MG tablet Take by mouth every 4 hours as needed for congestion 112 tablet      No Known Allergies  Recent Labs   Lab Test  08/14/17   1645  10/14/16   1056  02/03/14   1052  01/29/14   1109   A1C  7.7*  7.4*   --   7.7*   LDL   --   105*  117   --    HDL   --   43  38*   --    TRIG   --   215*  205*   --    ALT   --   29   --    --    CR   --   0.96   --    --    GFRESTIMATED   --   85   --    --    GFRESTBLACK   --   >90   GFR Calc     --    --    POTASSIUM   --   3.6   --    --    TSH   --   0.57   --    --       BP Readings from Last 3 Encounters:   08/14/17 136/86   10/14/16 136/88   02/03/14 128/88    Wt Readings from Last 3 Encounters:   08/14/17 225 lb 4 oz (102.2 kg)   10/14/16 221 lb (100.2 kg)   02/03/14 223 lb 6.4 oz (101.3 kg)           Reviewed and updated as needed this visit by clinical staff  Reviewed and updated as needed this visit by Provider    ROS:  See above     This document serves as a record of the services and decisions personally performed and made by Kesha Larkin MD. It was created on her behalf by Olive Centeno, a trained medical scribe. The creation of this document is based the provider's statements to the medical scribes.    Scribe Olive Centeno, August 14, 2017    OBJECTIVE:     /86 (Cuff Size: Adult Large)  Pulse 66  Temp 98.6  F (37  C) (Tympanic)  Wt 225 lb 4 oz (102.2 kg)  SpO2 97%  BMI 36.25 kg/m2  Body mass index is 36.25 kg/(m^2).  GENERAL: alert and no distress  EYES: Eyes grossly normal to inspection, PERRL and conjunctivae and sclerae normal  HENT: ear canals and TM's normal, nose and mouth without ulcers or lesions  NECK: no adenopathy, no asymmetry, masses  RESP: lungs clear to auscultation - no rales, rhonchi or wheezes  CV: regular rate and rhythm, normal S1 S2, no S3 or S4, no murmur, click or rub  ABDOMEN: soft, mildly ttp LLQ, no  hepatosplenomegaly, no masses and bowel sounds normal  MS: no gross musculoskeletal defects noted, no edema  SKIN: no suspicious lesions or rashes  NEURO: Normal strength and tone, mentation intact and speech normal  BACK: no CVA tenderness, no paralumbar tenderness  PSYCH: mentation appears normal, affect normal/bright  LYMPH: no cervical, supraclavicular, axillary, or inguinal adenopathy    Diagnostic Test Results:  Results for orders placed or performed in visit on 08/14/17   *UA reflex to Microscopic and Culture ([place] and [facility] (except [place] and [place])   Result Value Ref Range    Color Urine Yellow     Appearance Urine Clear     Glucose Urine 500 (A) NEG mg/dL    Bilirubin Urine Negative NEG    Ketones Urine 40 (A) NEG mg/dL    Specific Gravity Urine 1.020 1.003 - 1.035    Blood Urine Large (A) NEG    pH Urine 6.0 5.0 - 7.0 pH    Protein Albumin Urine 30 (A) NEG mg/dL    Urobilinogen Urine 0.2 0.2 - 1.0 EU/dL    Nitrite Urine Negative NEG    Leukocyte Esterase Urine Negative NEG    Source Midstream Urine    Hemoglobin A1c   Result Value Ref Range    Hemoglobin A1C 7.7 (H) 4.3 - 6.0 %   Urine Microscopic   Result Value Ref Range    WBC Urine Pending 0 - 2 /HPF    RBC Urine 25-50 (A) 0 - 2 /HPF    Bacteria Urine Few (A) NEG /HPF         ASSESSMENT/PLAN:     41yo M with type II DM who has not been taking his metformin for the past month here with recent polyuria and with LLQ/left flank pain particularly with urination, nausea, vomiting x 1 day. UA was remarkable for glucose 500mg/dL, ketones, protein and blood. I am concerned that his symptoms may be secondary to significant hyperglycemia with mild DKA, though he is a type II diabetic.  In addition given flank pain, hematuria and nausea/vomiting, nephrolithiasis is a possibility.   A1c is above goal at 7.7, but not significantly uncontrolled. BG elevated by meter today at 226. White count is mildly elevated as well. VSS. I have recommended he go  to the ER for further evaluation. Our RN has called [facility] ER to let them know he is coming. He has someone with him today who will be driving him to the ER from clinic.          The information in this document, created by the medical scribe for me, accurately reflects the services I personally performed and the decisions made by me. I have reviewed and approved this document for accuracy. 08/14/17    Kesha Larkin MD  [facility]

## 2017-08-14 NOTE — ED AVS SNAPSHOT
Emergency Department    [address] [place] [id number]    Phone:  [phone]    Fax:  [phone]                                       Sha Lopes   MRN: [id number]    Department:   Emergency Department   Date of Visit:  8/14/2017           After Visit Summary Signature Page     I have received my discharge instructions, and my questions have been answered. I have discussed any challenges I see with this plan with the nurse or doctor.    ..........................................................................................................................................  Patient/Patient Representative Signature      ..........................................................................................................................................  Patient Representative Print Name and Relationship to Patient    ..................................................               ................................................  Date                                            Time    ..........................................................................................................................................  Reviewed by Signature/Title    ...................................................              ..............................................  Date                                                            Time

## 2017-08-14 NOTE — NURSING NOTE
"Chief Complaint   Patient presents with     Abdominal Pain       Initial /86 (Cuff Size: Adult Large)  Pulse 66  Temp 98.6  F (37  C) (Tympanic)  Wt 225 lb 4 oz (102.2 kg)  SpO2 97%  BMI 36.25 kg/m2 Estimated body mass index is 36.25 kg/(m^2) as calculated from the following:    Height as of 10/14/16: 5' 6.1\" (1.679 m).    Weight as of this encounter: 225 lb 4 oz (102.2 kg).  Medication Reconciliation: complete     Leila Hunter, JUNE      "

## 2017-08-15 NOTE — ED PROVIDER NOTES
"  History     Chief Complaint:  Abdominal pain    HPI  Sha Lopes is a 42 year old male with a history of diabetes who presents with abdominal pain. The patient began experiencing left lower quadrant pain that worsened while urinating earlier this morning after waking up. He also notes some associated nausea and vomiting. He states waking up with slight abdominal pain and the need to urinate has not been uncommon the last month, but the fact that this has not alleviated is new. He has not been able to eat or drink much today at all. He was seen in clinic for this concern this morning where his blood sugar was read and noted to be high. He was sent here to the ED for further evaluation of this abdominal pain. The patient has no history of UTI or kidney stones.     Allergies:  No Known Drug Allergies    Medications:    Metformin  Prinivil  Sudafed    Past Medical History:    Diabetes    Past Surgical History:    History reviewed. No pertinent past surgical history.    Family History:    The patient denies any relevant family medical history.    Social History:  The patient was accompanied to the ED by his wife.  Smoking Status: No  Smokeless Tobacco: No  Alcohol Use: No  Marital Status:  Single     Review of Systems   Constitutional: Negative for fever.   Gastrointestinal: Positive for abdominal pain (LLQ), nausea and vomiting. Negative for diarrhea.   Genitourinary: Positive for frequency. Negative for dysuria and hematuria.   All other systems reviewed and are negative.    Physical Exam   Vitals:  Patient Vitals for the past 24 hrs:   BP Temp Temp src Pulse Resp SpO2 Height Weight   08/14/17 2200 151/85 - - 92 20 95 % - -   08/14/17 2100 - - - 95 20 96 % - -   08/14/17 2030 (!) 169/96 - - 83 18 94 % - -   08/14/17 2000 (!) 160/100 - - 92 20 97 % - -   08/14/17 1929 (!) 137/98 - - 83 18 94 % - -   08/14/17 1853 (!) 181/105 99  F (37.2  C) Oral 82 18 96 % 1.727 m (5' 8\") 101.6 kg (224 lb)     Physical " Exam  General:  Sitting on bed with wife at bedside.   HENT:  No obvious trauma to head  Right Ear:  External ear normal.   Left Ear:  External ear normal.   Nose:  Nose normal.   Eyes:  Conjunctivae and EOM are normal. Pupils are equal, round, and reactive.   Neck: Normal range of motion. Neck supple. No tracheal deviation present.   CV:  Normal heart sounds. No murmur heard.  Pulm/Chest: Effort normal and breath sounds normal.   Abd: Soft. No distension. Mild left lower quadrant tenderness. There is no rigidity, no rebound and no guarding.   M/S: Normal range of motion.   Neuro: Alert. GCS 15.  Skin: Skin is warm and dry. No rash noted. Not diaphoretic.   Psych: Normal mood and affect. Behavior is normal.     Emergency Department Course     Imaging:  Radiology findings were communicated with the patient who voiced understanding of the findings.  CT Abdomen Pelvis w/o contrast (stone protocol):  2 mm stone likely within a left ureterocele. Additional 5 mm stone in the distal left ureter approximately 1-2 cm from the ureterovesical junction. There is mild to moderate associated upstream hydronephrosis and hydroureter.  Per Radiologist.     Laboratory:  Laboratory findings were communicated with the patient who voiced understanding of the findings.  CBC: WBC: 14.7 (H), PLT: 136 (L) o/w WNL. (HGB 16.3)   CMP: Glucose: 230 (H), Bilirubin: 1.5 (H) o/w WNL (Creatinine 1.13)  Lipase: 110  Lactic acid: 2.6 (H)  Blood culture: Pending  UA: Glucose: 500 (A), Ketone: 80 (A), Blood: Large (A),   Urine microscopic: RBC: 5-10 (A)    Interventions:  1927 Normal Saline 1000 mL IV  2022 Normal Saline 1000 mL IV  2055 Zofran, 4 mg, IV  2123 Toradol, 30 mg, IV     Emergency Department Course:  Nursing notes and vitals reviewed.  1948 I had my initial encounter with the patient.  I performed an exam of the patient as documented above.   IV was inserted and blood was drawn for laboratory testing, results above.  The patient provided a  urine sample here in the emergency department. This was sent for laboratory testing, findings above.  The patient was sent for a CT while in the emergency department, results above.   3165 I updated the patient on their plan of care.    I discussed the treatment plan with the patient. They expressed understanding of this plan and consented to discharge. They will be discharged home with instructions for care and follow up. In addition, the patient will return to the emergency department if their symptoms persist, worsen, if new symptoms arise or if there is any concern.  All questions were answered.    I personally reviewed the laboratory results with the Patient and answered all related questions prior to discharge.    Impression & Plan    Medical Decision Making:  Sha Lopes is a very pleasant 42 year old year old patient who presents to the emergency department with concern of unilateral flank and abdominal pain consistent with renal colic. CT confirms a ureteral stone. Pain is controlled with interventions in the Emergency Department. There is no fever or evidence of a urinary tract infection. The patient is a diabetic and had a slightly elevated lactate which caused some concern. He is provided IV fluids and the lactate was repeated and found to be normal. I suspect the initial elevation is related to his nausea and vomiting from this. The patient will be discharged with opioid analgesics and Ibuprofen for pain. Flomax will be prescribed daily to attempt to ease stone passage. I discussed he cannot take Viagra or any similar products while on Flomax. He should also discontinue Flomax if he becomes lightheaded or dizzy. Zofran was prescribed for nausea.  I considered other etiologies for these symptoms including AAA and pyelonephritis but these are unlikely given the otherwise normal CT scan and urinalysis.  The patient is instructed to return if increasing pain not controlled with pain meds, vomiting,  and fever. Strain urine to look for stone, if detected, submit to primary doctor for lab analysis or urologist at their discretion. Instructions were given to follow up with urology within one week, sooner if pain continues, as retrieval of the stone may be required for refractory symptoms.    The treatment plan was discussed with the patient and they expressed understanding of this plan and consented to the plan.  In addition, the patient will return to the emergency department if their symptoms persist, worsen, if new symptoms arise or if there is any concern as other pathology may be present that is not evident at this time. They also understand the importance of close follow up in the clinic and if unable to do so will return to the emergency department for a reevaluation. All questions were answered.    Diagnosis:    ICD-10-CM    1. Kidney stone N20.0    2. Leukocytosis, unspecified type D72.829    3. Elevated lactic acid level R79.89      Disposition:   Discharge    Discharge Medications:  Discharge Medication List as of 8/14/2017  9:57 PM      START taking these medications    Details   oxyCODONE-acetaminophen (PERCOCET) 5-325 MG per tablet Take 1 tablet by mouth every 4 hours as needed for pain, Disp-12 tablet, R-0, Local Print      tamsulosin (FLOMAX) 0.4 MG capsule Take 1 capsule (0.4 mg) by mouth daily for 7 doses, Disp-7 capsule, R-0, Local Print           Scribe Disclosure:  Dong MANCINI, am serving as a scribe at 7:17 PM on 8/14/2017 to document services personally performed by Dk Bryson DO, based on my observations and the provider's statements to me.    8/14/2017    EMERGENCY DEPARTMENT       Dk Bryson DO  08/14/17 0727

## 2017-08-15 NOTE — DISCHARGE INSTRUCTIONS
"   * KIDNEY STONE (w/ Colic)    The sharp cramping pain and nausea/vomiting that you have is due to a small stone which has formed in the kidney and is now passing down a narrow tube (ureter) on its way to your bladder. Once it reaches your bladder, the pain will stop. The stone may pass in your urine stream in one piece. [The size may be 1/16\" to 1/4\" (1-6mm)]. Or, the stone may also break up into rebeca fragments which you may not even notice.  Once you have had a kidney stone there is a risk for recurrence in the future.  HOME CARE:    Drink lots of fluid (at least 8-10 glasses of water a day).    Most stones will pass on their own, but may take from a few hours to a few days.    Each time you urinate, do so in a jar. Pour the urine from the jar through the strainer and into the toilet. Continue doing this until 24 hours after your pain stops. By then, if there was a kidney stone, it should pass from your bladder. Some stones dissolve into sand-like particles and pass right through the strainer. In that case, you won't ever see a stone.    Save any stone that you find in the strainer and bring it to your doctor for analysis. It may be possible to prevent certain types of stones from forming. Therefore, it is important to know what kind of stone you have.    Try to stay as active as possible since this will help the stone pass. Do not stay in bed unless your pain prevents you from getting up. You may notice a red, pink or brown color to your urine. This is normal while passing a kidney stone.  FOLLOW UP with your doctor or return to this facility if the pain lasts more than 48 hours.  GET PROMPT MEDICAL ATTENTION if any of the following occur:    Pain that is not controlled by the medicine given    Repeated vomiting or unable to keep down fluids    Weakness, dizziness or fainting    Fever over 101  F (38.3  C)    Passage of solid red or brown urine (can't see through it) or urine with lots of blood clots    Unable " to pass urine for 8 hours and increasing bladder pressure    [id number] [employer], [address] [place] [address], [place] [address]. All rights reserved. This information is not intended as a substitute for professional medical care. Always follow your healthcare professional's instructions.

## 2017-08-20 LAB
BACTERIA SPEC CULT: NO GROWTH
BACTERIA SPEC CULT: NO GROWTH
Lab: NORMAL
Lab: NORMAL
SPECIMEN SOURCE: NORMAL
SPECIMEN SOURCE: NORMAL

## 2017-11-28 ENCOUNTER — OFFICE VISIT (OUTPATIENT)
Dept: FAMILY MEDICINE | Facility: CLINIC | Age: 42
End: 2017-11-28
Payer: COMMERCIAL

## 2017-11-28 VITALS
DIASTOLIC BLOOD PRESSURE: 79 MMHG | RESPIRATION RATE: 14 BRPM | BODY MASS INDEX: 34.33 KG/M2 | OXYGEN SATURATION: 98 % | SYSTOLIC BLOOD PRESSURE: 127 MMHG | TEMPERATURE: 97.9 F | HEART RATE: 57 BPM | WEIGHT: 225.75 LBS

## 2017-11-28 DIAGNOSIS — N20.0 NEPHROLITHIASIS: ICD-10-CM

## 2017-11-28 DIAGNOSIS — E78.5 HYPERLIPIDEMIA LDL GOAL <100: ICD-10-CM

## 2017-11-28 DIAGNOSIS — E11.9 TYPE 2 DIABETES MELLITUS WITHOUT COMPLICATION, WITHOUT LONG-TERM CURRENT USE OF INSULIN (H): Primary | ICD-10-CM

## 2017-11-28 DIAGNOSIS — E66.01 MORBID OBESITY, UNSPECIFIED OBESITY TYPE (H): ICD-10-CM

## 2017-11-28 LAB
BASOPHILS # BLD AUTO: 0 10E9/L (ref 0–0.2)
BASOPHILS NFR BLD AUTO: 0.4 %
DIFFERENTIAL METHOD BLD: ABNORMAL
EOSINOPHIL # BLD AUTO: 0.2 10E9/L (ref 0–0.7)
EOSINOPHIL NFR BLD AUTO: 2.4 %
ERYTHROCYTE [DISTWIDTH] IN BLOOD BY AUTOMATED COUNT: 13.4 % (ref 10–15)
HBA1C MFR BLD: 7.4 % (ref 4.3–6)
HCT VFR BLD AUTO: 42.6 % (ref 40–53)
HGB BLD-MCNC: 15 G/DL (ref 13.3–17.7)
LYMPHOCYTES # BLD AUTO: 1.9 10E9/L (ref 0.8–5.3)
LYMPHOCYTES NFR BLD AUTO: 28.6 %
MCH RBC QN AUTO: 30.8 PG (ref 26.5–33)
MCHC RBC AUTO-ENTMCNC: 35.2 G/DL (ref 31.5–36.5)
MCV RBC AUTO: 88 FL (ref 78–100)
MONOCYTES # BLD AUTO: 0.5 10E9/L (ref 0–1.3)
MONOCYTES NFR BLD AUTO: 6.9 %
NEUTROPHILS # BLD AUTO: 4.1 10E9/L (ref 1.6–8.3)
NEUTROPHILS NFR BLD AUTO: 61.7 %
PLATELET # BLD AUTO: 148 10E9/L (ref 150–450)
RBC # BLD AUTO: 4.87 10E12/L (ref 4.4–5.9)
WBC # BLD AUTO: 6.7 10E9/L (ref 4–11)

## 2017-11-28 PROCEDURE — 82043 UR ALBUMIN QUANTITATIVE: CPT | Performed by: FAMILY MEDICINE

## 2017-11-28 PROCEDURE — 80061 LIPID PANEL: CPT | Performed by: FAMILY MEDICINE

## 2017-11-28 PROCEDURE — 99207 C FOOT EXAM  NO CHARGE: CPT | Performed by: FAMILY MEDICINE

## 2017-11-28 PROCEDURE — 99214 OFFICE O/P EST MOD 30 MIN: CPT | Performed by: FAMILY MEDICINE

## 2017-11-28 PROCEDURE — 36415 COLL VENOUS BLD VENIPUNCTURE: CPT | Performed by: FAMILY MEDICINE

## 2017-11-28 PROCEDURE — 80050 GENERAL HEALTH PANEL: CPT | Performed by: FAMILY MEDICINE

## 2017-11-28 PROCEDURE — 83036 HEMOGLOBIN GLYCOSYLATED A1C: CPT | Performed by: FAMILY MEDICINE

## 2017-11-28 RX ORDER — LISINOPRIL 5 MG/1
5 TABLET ORAL DAILY
Qty: 90 TABLET | Refills: 1 | Status: SHIPPED | OUTPATIENT
Start: 2017-11-28

## 2017-11-28 NOTE — MR AVS SNAPSHOT
After Visit Summary   11/28/2017    Sha Lopes    MRN: [id number]           Patient Information     Date Of Birth          1975        Visit Information        Provider Department      11/28/2017 2:20 PM Cherry Grady MD [facility]        Today's Diagnoses     Type 2 diabetes mellitus without complication, without long-term current use of insulin (H)    -  1    BMI >35 with comorbidity DM        Nephrolithiasis        Need for 23-polyvalent pneumococcal polysaccharide vaccine        Need for Tdap vaccination        Type 2 diabetes mellitus with hyperglycemia, without long-term current use of insulin (H)        Hyperlipidemia LDL goal <100          Care Instructions    Labs stable  Refilled meds  Continue same  Work on diet and exercise  Consider victoza in the future  recommend Tdap and pneumovax 23 sots  Will mail remaining labs  See urology in future if has more kidney stones  Push water   Gave enough meds for 6 months until can establish with new provider in [place]           Follow-ups after your visit        Who to contact     If you have questions or need follow up information about today's clinic visit or your schedule please contact [facility] directly at [phone].  Normal or non-critical lab and imaging results will be communicated to you by [employer], letter or phone within 4 business days after the clinic has received the results. If you do not hear from us within 7 days, please contact the clinic through ShoeDazzlet or phone. If you have a critical or abnormal lab result, we will notify you by phone as soon as possible.  Submit refill requests through JumpChat or call your pharmacy and they will forward the refill request to us. Please allow 3 business days for your refill to be completed.          Additional Information About Your Visit        MyChart Information     JumpChat lets you send messages to your doctor, view your test results, renew your  "prescriptions, schedule appointments and more. To sign up, go to [email or web address] . Click on \"Log in\" on the left side of the screen, which will take you to the Welcome page. Then click on \"Sign up Now\" on the right side of the page.     You will be asked to enter the access code listed below, as well as some personal information. Please follow the directions to create your username and password.     Your access code is: [email or web address]  Expires: 2018  2:52 PM     Your access code will  in 90 days. If you need help or a new code, please call your [place] clinic or [phone].        Care EveryWhere ID     This is your Care EveryWhere ID. This could be used by other organizations to access your [place] medical records  [id number]        Your Vitals Were     Pulse Temperature Respirations Pulse Oximetry BMI (Body Mass Index)       57 97.9  F (36.6  C) (Oral) 14 98% 34.33 kg/m2        Blood Pressure from Last 3 Encounters:   17 127/79   17 151/85   17 136/86    Weight from Last 3 Encounters:   17 225 lb 12 oz (102.4 kg)   17 224 lb (101.6 kg)   17 225 lb 4 oz (102.2 kg)              We Performed the Following     Albumin Random Urine Quantitative with Creat Ratio     C FOOT EXAM  NO CHARGE     CBC with platelets differential     Comprehensive metabolic panel     Hemoglobin A1c     Lipid panel reflex to direct LDL Fasting     TSH with free T4 reflex          Where to get your medicines      These medications were sent to [facility] [address] [place] [address] AT [address] [address]     Phone:  [phone]     lisinopril 5 MG tablet    metFORMIN 1000 MG tablet          Primary Care Provider Office Phone # Fax #    Joel Daniel Irwin Wegener, [phone] [address] [facility] [place] [address]        Equal Access to Services     JL RODRIGUEZ AH: Angelina burris " [place], waneyda luqadaha, qaybta kaalrebeca main, prachi nicholasin hayaan juan manuelisela brandontamia laThaolebron desmond. So [facility] [phone].    ATENCIÓN: Si mariajose betancur, tiene a issa disposición servicios gratuitos de asistencia lingüística. Roro al [phone].    We comply with applicable federal civil rights laws and [place] laws. We do not discriminate on the basis of race, color, national origin, age, disability, sex, sexual orientation, or gender identity.            Thank you!     Thank you for choosing [facility]  for your care. Our goal is always to provide you with excellent care. Hearing back from our patients is one way we can continue to improve our services. Please take a few minutes to complete the written survey that you may receive in the mail after your visit with us. Thank you!             Your Updated Medication List - Protect others around you: Learn how to safely use, store and throw away your medicines at www.disposemymeds.org.          This list is accurate as of: 11/28/17  2:52 PM.  Always use your most recent med list.                   Brand Name Dispense Instructions for use Diagnosis    blood glucose monitoring test strip    no brand specified    100 each    One touch verio test strips Use to test blood sugars two times daily or as directed    Type 2 diabetes mellitus with hyperglycemia, without long-term current use of insulin (H)       lisinopril 5 MG tablet    PRINIVIL/ZESTRIL    90 tablet    Take 1 tablet (5 mg) by mouth daily    Hyperlipidemia LDL goal <100, Type 2 diabetes mellitus with hyperglycemia, without long-term current use of insulin (H)       metFORMIN 1000 MG tablet    GLUCOPHAGE    180 tablet    Take 1 tablet (1,000 mg) by mouth 2 times daily (with meals)    Type 2 diabetes mellitus with hyperglycemia, without long-term current use of insulin (H), Hyperlipidemia LDL goal <100       Multi-vitamin Tabs tablet     100 tablet    Take 1 tablet by mouth daily

## 2017-11-28 NOTE — PROGRESS NOTES
SUBJECTIVE:   Sha Lopes is a 42 year old male who presents to clinic today for the following health issues:      Diabetes Follow-up      Patient is checking blood sugars: 1 times a week around 130's    Diabetic concerns: None     Symptoms of hypoglycemia (low blood sugar): none     Paresthesias (numbness or burning in feet) or sores: No     Date of last diabetic eye exam: 3 years        Amount of exercise or physical activity: a 4 mile walk in every other day    Problems taking medications regularly: No    Medication side effects: none    Diet: regular (no restrictions) and low carb's    Also, want to follow up with kidney stone due to history.    Couldn't get in to with PCP   In one week moving to [place] permanently to be with fiance. Getting  and got a job there.     Hx of BMI > 35 , type 2 DM on metformin 1000 mg FH of DM & two siblings in early 40's with coronary disease one with recent MI, hx of kidney stones was under care of PCP dr Wegener, last seen by him in 2016 when noted was under insured, seen by Dr Larkin 8/14 for abdominal pain , polyuria, hematuria, flank pain, at that time had not been taking his metformin for the prior month also had  nausea, vomiting x 1 day. UA was remarkable for glucose 500 mg/dL, ketones, protein and blood. A1c was above goal at 7.7, but not significantly uncontrolled. BG elevated by meter at 226. White count was mildly elevated as well. Due to concern for Hyperosmolar state and renal colic sent to ER In ER evaluated for unilateral flank and abdominal pain consistent with renal colic. CT confirmed a ureteral stone. He had a 2 mm stone likely within a left ureterocele. And an additional 5 mm stone in the distal left ureter approximately 1-2 cm from the ureterovesical junction. There was mild to moderate associated upstream hydronephrosis and hydroureter. Pain was controlled with iv pain meds and IVF.. There was no fever or evidence of a urinary tract  infection.he had a slightly elevated lactate and was provided IV fluids and the lactate was repeated and found to be normal.he was discharged on percocet # 12 and Flomax for 7 days and Ibuprofen for pain.  Zofran was prescribed for nausea.  Was advised to Strain his urine to look for stone, if detected, submit to primary doctor for lab analysis or urologist at their discretion. Instructions were given to follow up with urology within one week, sooner if pain continued, as retrieval of the stone may be required for refractory symptoms. He reports symptoms subsided and no repeat episode since changed diet and drinking more fluids and avoiding soda and cut back on caffeine. unable to obtain a stone for analysis and didn't see urology as pain and symptoms resolved.   Checks BP at home as mother was a nurse. BP normal here high at home. Trying to wrap stuff up   A lot of stress related to moving but feels already a major release of stress with upcoming new job as a an  for college in [place].     Has seasonal allergies,mild     Noted prior elevated lipids. Told should take meds like Statins. Reports Father and older brother had bad reaction to statin, digestive things. Older brother on dose of 40 mg daily of something and seems to be doing ok. Would wait to start statin once establish's with new provider in [place].is leaving [place] few days.     Recently recovered from a cold. Currently No fever or chills, no headache or dizziness, no earache, sore throat, runny nose, no trouble hearing, smelling, tasting or swallowing, no chest pain trouble breathing or palpitations, No heart burn, reflux, nausea or vomiting or diarrhea or constipation, no blood in stools or black stools, no weight loss or night sweats. No urinary complaints. No pelvic complaints. No leg swelling or rash. Or joint pain.     Hyperlipidemia Follow-Up      Rate your low fat/cholesterol diet?: not monitoring fat    Taking statin?   No    Other lipid medications/supplements?:  none    Problem list and histories reviewed & adjusted, as indicated.  Additional history: as documented    Patient Active Problem List   Diagnosis     Family history of diabetes mellitus     Type 2 diabetes, HbA1c goal < 7% (H)     BMI >35 with comorbidity DM     Nephrolithiasis     History reviewed. No pertinent surgical history.    Social History   Substance Use Topics     Smoking status: Never Smoker     Smokeless tobacco: Never Used     Alcohol use No     Family History   Problem Relation Age of Onset     HEART DISEASE Mother      Hyperlipidemia Father      Colon Cancer Paternal Grandfather          Current Outpatient Prescriptions   Medication Sig Dispense Refill     lisinopril (PRINIVIL/ZESTRIL) 5 MG tablet Take 1 tablet (5 mg) by mouth daily 90 tablet 1     metFORMIN (GLUCOPHAGE) 1000 MG tablet Take 1 tablet (1,000 mg) by mouth 2 times daily (with meals) 180 tablet 1     blood glucose monitoring (NO BRAND SPECIFIED) test strip One touch verio test strips Use to test blood sugars two times daily or as directed 100 each 11     multivitamin, therapeutic with minerals (MULTI-VITAMIN) TABS Take 1 tablet by mouth daily 100 tablet 3     No Known Allergies  Recent Labs   Lab Test  11/28/17   1438  08/14/17   1910  08/14/17   1645  10/14/16   1056   02/03/14   1052   A1C  7.4*   --   7.7*  7.4*   --    --    LDL  125*   --    --   105*   --   117   HDL  50   --    --   43   --   38*   TRIG  216*   --    --   215*   --   205*   ALT  28  43   --   29   --    --    CR  0.89  1.13   --   0.96   < >   --    GFRESTIMATED  >90  70   --   85   < >   --    GFRESTBLACK  >90  85   --   >90   GFR Calc     < >   --    POTASSIUM  3.7  4.2   --   3.6   < >   --    TSH  0.89   --    --   0.57   --    --     < > = values in this interval not displayed.      BP Readings from Last 3 Encounters:   11/28/17 127/79   08/14/17 151/85   08/14/17 136/86    Wt Readings from Last 3  Encounters:   11/28/17 225 lb 12 oz (102.4 kg)   08/14/17 224 lb (101.6 kg)   08/14/17 225 lb 4 oz (102.2 kg)                  Labs reviewed in EPIC          Reviewed and updated as needed this visit by clinical staffTobacco  Allergies  Meds  Med Hx  Surg Hx  Fam Hx  Soc Hx      Reviewed and updated as needed this visit by Provider         ROS:  Constitutional, HEENT, cardiovascular, pulmonary, GI, , musculoskeletal, neuro, skin, endocrine and psych systems are negative, except as otherwise noted.      OBJECTIVE:   /79 (BP Location: Right arm, Patient Position: Chair, Cuff Size: Adult Regular)  Pulse 57  Temp 97.9  F (36.6  C) (Oral)  Resp 14  Wt 225 lb 12 oz (102.4 kg)  SpO2 98%  BMI 34.33 kg/m2  Body mass index is 34.33 kg/(m^2).  GENERAL: healthy, alert and no distress  EYES: Eyes grossly normal to inspection, PERRL and conjunctivae and sclerae normal  HENT: ear canals and TM's normal, nose and mouth without ulcers or lesions  NECK: no adenopathy, no asymmetry, masses, or scars and thyroid normal to palpation  RESP: lungs clear to auscultation - no rales, rhonchi or wheezes  CV: regular rate and rhythm, normal S1 S2, no S3 or S4, no murmur, click or rub, no peripheral edema and peripheral pulses strong  ABDOMEN: soft, non tender, no hepatosplenomegaly, no masses and bowel sounds normal  MS: no gross musculoskeletal defects noted, no edema, monofilament and vibratory sense intact bilaterally  SKIN: no suspicious lesions or rashes  NEURO: Normal strength and tone, mentation intact and speech normal  PSYCH: mentation appears normal, affect normal/bright    Diagnostic Test Results:  Results for orders placed or performed in visit on 11/28/17   Hemoglobin A1c   Result Value Ref Range    Hemoglobin A1C 7.4 (H) 4.3 - 6.0 %   CBC with platelets differential   Result Value Ref Range    WBC 6.7 4.0 - 11.0 10e9/L    RBC Count 4.87 4.4 - 5.9 10e12/L    Hemoglobin 15.0 13.3 - 17.7 g/dL    Hematocrit 42.6  40.0 - 53.0 %    MCV 88 78 - 100 fl    MCH 30.8 26.5 - 33.0 pg    MCHC 35.2 31.5 - 36.5 g/dL    RDW 13.4 10.0 - 15.0 %    Platelet Count 148 (L) 150 - 450 10e9/L    Diff Method Automated Method     % Neutrophils 61.7 %    % Lymphocytes 28.6 %    % Monocytes 6.9 %    % Eosinophils 2.4 %    % Basophils 0.4 %    Absolute Neutrophil 4.1 1.6 - 8.3 10e9/L    Absolute Lymphocytes 1.9 0.8 - 5.3 10e9/L    Absolute Monocytes 0.5 0.0 - 1.3 10e9/L    Absolute Eosinophils 0.2 0.0 - 0.7 10e9/L    Absolute Basophils 0.0 0.0 - 0.2 10e9/L   Comprehensive metabolic panel   Result Value Ref Range    Sodium 138 133 - 144 mmol/L    Potassium 3.7 3.4 - 5.3 mmol/L    Chloride 106 94 - 109 mmol/L    Carbon Dioxide 23 20 - 32 mmol/L    Anion Gap 9 3 - 14 mmol/L    Glucose 124 (H) 70 - 99 mg/dL    Urea Nitrogen 18 7 - 30 mg/dL    Creatinine 0.89 0.66 - 1.25 mg/dL    GFR Estimate >90 >60 mL/min/1.7m2    GFR Estimate If Black >90 >60 mL/min/1.7m2    Calcium 8.9 8.5 - 10.1 mg/dL    Bilirubin Total 1.0 0.2 - 1.3 mg/dL    Albumin 4.1 3.4 - 5.0 g/dL    Protein Total 7.4 6.8 - 8.8 g/dL    Alkaline Phosphatase 47 40 - 150 U/L    ALT 28 0 - 70 U/L    AST 16 0 - 45 U/L   Albumin Random Urine Quantitative with Creat Ratio   Result Value Ref Range    Creatinine Urine 187 mg/dL    Albumin Urine mg/L 12 mg/L    Albumin Urine mg/g Cr 6.36 0 - 17 mg/g Cr   Lipid panel reflex to direct LDL Fasting   Result Value Ref Range    Cholesterol 218 (H) <200 mg/dL    Triglycerides 216 (H) <150 mg/dL    HDL Cholesterol 50 >39 mg/dL    LDL Cholesterol Calculated 125 (H) <100 mg/dL    Non HDL Cholesterol 168 (H) <130 mg/dL   TSH with free T4 reflex   Result Value Ref Range    TSH 0.89 0.40 - 4.00 mU/L       ASSESSMENT/PLAN:   Hx of BMI > 35 , type 2 DM on metformin 1000 mg FH of DM & two siblings in early 40's with coronary disease one with recent MI, hx of kidney stones was under care of PCP dr Wegener, last seen by him in 2016 when noted was under insured, seen by   House/14 for abdominal pain , polyuria, hematuria, flank pain, at that time had not been taking his metformin for the prior month also had  nausea, vomiting x 1 day. UA was remarkable for glucose 500 mg/dL, ketones, protein and blood. A1c was above goal at 7.7, but not significantly uncontrolled. BG elevated by meter at 226. White count was mildly elevated as well. Due to concern for Hyperosmolar state and renal colic sent to ER In ER evaluated for unilateral flank and abdominal pain consistent with renal colic. CT confirmed a ureteral stone. He had a 2 mm stone likely within a left ureterocele. And an additional 5 mm stone in the distal left ureter approximately 1-2 cm from the ureterovesical junction. There was mild to moderate associated upstream hydronephrosis and hydroureter. Pain was controlled with iv pain meds and IVF.. There was no fever or evidence of a urinary tract infection.he had a slightly elevated lactate and was provided IV fluids and the lactate was repeated and found to be normal.he was discharged on percocet # 12 and Flomax for 7 days and Ibuprofen for pain.  Zofran was prescribed for nausea.  Was advised to Strain his urine to look for stone, if detected, submit to primary doctor for lab analysis or urologist at their discretion. Instructions were given to follow up with urology within one week, sooner if pain continued, as retrieval of the stone may be required for refractory symptoms. He reports symptoms subsided and no repeat episode since changed diet and drinking more fluids and avoiding soda and cut back on caffeine. unable to obtain a stone for analysis and didn't see urology as pain and symptoms resolved.     1. Type 2 diabetes mellitus without complication, without long-term current use of insulin (H)  DM mildly better, working on diet and exercise. Moving in few days to [place] where getting  and has a new job. Labs stable. Refilled meds. Continue same. Work on diet and  exercise. Consider victoza in the future. recommend Tdap and pneumovax 23 shots. Declined today. Will mail remaining labs. See urology in future if has more kidney stones. Push water. Gave enough meds for 6 months until can establish with new provider in [place]   - C FOOT EXAM  NO CHARGE  - Hemoglobin A1c  - CBC with platelets differential  - Comprehensive metabolic panel  - Albumin Random Urine Quantitative with Creat Ratio  - Lipid panel reflex to direct LDL Fasting  - TSH with free T4 reflex  - lisinopril (PRINIVIL/ZESTRIL) 5 MG tablet; Take 1 tablet (5 mg) by mouth daily  Dispense: 90 tablet; Refill: 1  - metFORMIN (GLUCOPHAGE) 1000 MG tablet; Take 1 tablet (1,000 mg) by mouth 2 times daily (with meals)  Dispense: 180 tablet; Refill: 1    2. BMI >35 with comorbidity DM  Diet ,exercise, weight loss recommended  - TSH with free T4 reflex    3. Nephrolithiasis  Push fluids, see urology in future if recurs, if has renal colic, strain urine to collect stone for analysis.   - Comprehensive metabolic panel    4. Need for 23-polyvalent pneumococcal polysaccharide vaccine  Declined today     5. Need for Tdap vaccination  Declined today    6. Hyperlipidemia LDL goal <100  - Lipid panel reflex to direct LDL Fasting  Declines statins .     See Patient Instructions    Cherry Grady MD  [facility]

## 2017-11-28 NOTE — LETTER
November 30, 2017      Sha Lopes  [address] [facility] [address]        Dear ,    We are writing to inform you of your test results.    Results within acceptable limits.  -Liver and gallbladder tests (ALT,AST, Alk phos,bilirubin) are normal.  -Kidney function (GFR) is normal.  -Sodium is normal.  -Potassium is normal.  -LDL(bad) cholesterol level is elevated, HDL(good) cholesterol level is normal and your triglycerides are elevated which can increase your heart disease risk.  A diet high in fat and simple carbohydrates, genetics and being overweight can contribute to this. ADVISE: Exercise, a low fat, low carbohydrate diet, weight control, and omega-3 fatty acids (fish oil) 2451-7201 mg daily are helpful to improve this.  Rechecking your fasting cholesterol panel in 6 months is recommended (Lipid w/ LDL reflex, DX: hyperlipidemia)  -TSH (thyroid stimulating hormone) level is normal which indicates normal thyroid function.  -Microalbumin (urine protein) test is normal.  ADVISE: recheck annually.  The 10-year ASCVD risk score (Monclova DC Jr, et al., 2013) is: 4.6%    Values used to calculate the score:      Age: 42 years      Sex: Male      Is Non- : No      Diabetic: Yes      Tobacco smoker: No      Systolic Blood Pressure: 127 mmHg      Is BP treated: Yes      HDL Cholesterol: 50 mg/dL      Total Cholesterol: 218 mg/dL  Desired or goal levels are:  CHOLESTEROL: Desirable is less than 200.   HDL (Good Cholesterol): Desirable is greater than 40 (for men) greater than 50 (for women).  LDL (Bad Cholesterol): Desirable is less than 130 (or less than 100 if you have heart disease or diabetes). Borderline 130-160.  TRIGLYCERIDES: Desirable is less than 150.  Borderline is 150-200.    To help lower your LDL (bad cholesterol) I recommend you start adding ground flax seed to your diet. The recommended dose is 2 heaping tablespoonfuls daily, you may want to start with 1  tablespoonful and increase to 2 heaping tablespoonfuls. You can mix or add ground flax seed to hot cereals, yogurt, applesauce, cottage cheese or any sauce mixture that is your portion. Ground flax seed can be found in the baking aisle near the flour.      As you may know, an elevated cholesterol is one factor that increases your risk for heart disease and stroke. You can improve your cholesterol by controlling the amount and type of fat you eat and by increasing your daily activity level.    Here are some ways to improve your nutrition:  Eat less fat (especially butter, Crisco and other saturated fats)  Buy lean cuts of meat, reduce your portions of red meat or substitute poultry or fish  Use skim milk and low-fat dairy products  Eat no more than 4 egg yolks per week  Avoid fried or fast foods that are high in fat  Eat more fruits and vegetables      Also consider starting or increasing your aerobic activity. Aerobic activity is the best way to improve HDL (good) cholesterol. If this would be new to you, please talk with me first about what activities are safe for you.    Resulted Orders   Hemoglobin A1c   Result Value Ref Range    Hemoglobin A1C 7.4 (H) 4.3 - 6.0 %   CBC with platelets differential   Result Value Ref Range    WBC 6.7 4.0 - 11.0 10e9/L    RBC Count 4.87 4.4 - 5.9 10e12/L    Hemoglobin 15.0 13.3 - 17.7 g/dL    Hematocrit 42.6 40.0 - 53.0 %    MCV 88 78 - 100 fl    MCH 30.8 26.5 - 33.0 pg    MCHC 35.2 31.5 - 36.5 g/dL    RDW 13.4 10.0 - 15.0 %    Platelet Count 148 (L) 150 - 450 10e9/L    Diff Method Automated Method     % Neutrophils 61.7 %    % Lymphocytes 28.6 %    % Monocytes 6.9 %    % Eosinophils 2.4 %    % Basophils 0.4 %    Absolute Neutrophil 4.1 1.6 - 8.3 10e9/L    Absolute Lymphocytes 1.9 0.8 - 5.3 10e9/L    Absolute Monocytes 0.5 0.0 - 1.3 10e9/L    Absolute Eosinophils 0.2 0.0 - 0.7 10e9/L    Absolute Basophils 0.0 0.0 - 0.2 10e9/L   Comprehensive metabolic panel   Result Value Ref Range     Sodium 138 133 - 144 mmol/L    Potassium 3.7 3.4 - 5.3 mmol/L    Chloride 106 94 - 109 mmol/L    Carbon Dioxide 23 20 - 32 mmol/L    Anion Gap 9 3 - 14 mmol/L    Glucose 124 (H) 70 - 99 mg/dL    Urea Nitrogen 18 7 - 30 mg/dL    Creatinine 0.89 0.66 - 1.25 mg/dL    GFR Estimate >90 >60 mL/min/1.7m2      Comment:      Non  GFR Calc    GFR Estimate If Black >90 >60 mL/min/1.7m2      Comment:       GFR Calc    Calcium 8.9 8.5 - 10.1 mg/dL    Bilirubin Total 1.0 0.2 - 1.3 mg/dL    Albumin 4.1 3.4 - 5.0 g/dL    Protein Total 7.4 6.8 - 8.8 g/dL    Alkaline Phosphatase 47 40 - 150 U/L    ALT 28 0 - 70 U/L    AST 16 0 - 45 U/L   Albumin Random Urine Quantitative with Creat Ratio   Result Value Ref Range    Creatinine Urine 187 mg/dL    Albumin Urine mg/L 12 mg/L    Albumin Urine mg/g Cr 6.36 0 - 17 mg/g Cr   Lipid panel reflex to direct LDL Fasting   Result Value Ref Range    Cholesterol 218 (H) <200 mg/dL      Comment:      Desirable:       <200 mg/dl    Triglycerides 216 (H) <150 mg/dL      Comment:      Borderline high:  150-199 mg/dl  High:             200-499 mg/dl  Very high:       >499 mg/dl      HDL Cholesterol 50 >39 mg/dL    LDL Cholesterol Calculated 125 (H) <100 mg/dL      Comment:      Above desirable:  100-129 mg/dl  Borderline High:  130-159 mg/dL  High:             160-189 mg/dL  Very high:       >189 mg/dl      Non HDL Cholesterol 168 (H) <130 mg/dL      Comment:      Above Desirable:  130-159 mg/dl  Borderline high:  160-189 mg/dl  High:             190-219 mg/dl  Very high:       >219 mg/dl     TSH with free T4 reflex   Result Value Ref Range    TSH 0.89 0.40 - 4.00 mU/L     If you have any questions or concerns, please call the clinic at the number listed above.     Sincerely,    Cherry Grady MD/nr

## 2017-11-28 NOTE — PATIENT INSTRUCTIONS
Labs stable  Refilled meds  Continue same  Work on diet and exercise  Consider victoza in the future  recommend Tdap and pneumovax 23 sots  Will mail remaining labs  See urology in future if has more kidney stones  Push water   Gave enough meds for 6 months until can establish with new provider in [place]

## 2017-11-29 LAB
ALBUMIN SERPL-MCNC: 4.1 G/DL (ref 3.4–5)
ALP SERPL-CCNC: 47 U/L (ref 40–150)
ALT SERPL W P-5'-P-CCNC: 28 U/L (ref 0–70)
ANION GAP SERPL CALCULATED.3IONS-SCNC: 9 MMOL/L (ref 3–14)
AST SERPL W P-5'-P-CCNC: 16 U/L (ref 0–45)
BILIRUB SERPL-MCNC: 1 MG/DL (ref 0.2–1.3)
BUN SERPL-MCNC: 18 MG/DL (ref 7–30)
CALCIUM SERPL-MCNC: 8.9 MG/DL (ref 8.5–10.1)
CHLORIDE SERPL-SCNC: 106 MMOL/L (ref 94–109)
CHOLEST SERPL-MCNC: 218 MG/DL
CO2 SERPL-SCNC: 23 MMOL/L (ref 20–32)
CREAT SERPL-MCNC: 0.89 MG/DL (ref 0.66–1.25)
CREAT UR-MCNC: 187 MG/DL
GFR SERPL CREATININE-BSD FRML MDRD: >90 ML/MIN/1.7M2
GLUCOSE SERPL-MCNC: 124 MG/DL (ref 70–99)
HDLC SERPL-MCNC: 50 MG/DL
LDLC SERPL CALC-MCNC: 125 MG/DL
MICROALBUMIN UR-MCNC: 12 MG/L
MICROALBUMIN/CREAT UR: 6.36 MG/G CR (ref 0–17)
NONHDLC SERPL-MCNC: 168 MG/DL
POTASSIUM SERPL-SCNC: 3.7 MMOL/L (ref 3.4–5.3)
PROT SERPL-MCNC: 7.4 G/DL (ref 6.8–8.8)
SODIUM SERPL-SCNC: 138 MMOL/L (ref 133–144)
TRIGL SERPL-MCNC: 216 MG/DL
TSH SERPL DL<=0.005 MIU/L-ACNC: 0.89 MU/L (ref 0.4–4)

## 2017-11-29 NOTE — PROGRESS NOTES
Results within acceptable limits.  -Liver and gallbladder tests (ALT,AST, Alk phos,bilirubin) are normal.  -Kidney function (GFR) is normal.  -Sodium is normal.  -Potassium is normal.  -LDL(bad) cholesterol level is elevated, HDL(good) cholesterol level is normal and your triglycerides are elevated which can increase your heart disease risk.  A diet high in fat and simple carbohydrates, genetics and being overweight can contribute to this. ADVISE: Exercise, a low fat, low carbohydrate diet, weight control, and omega-3 fatty acids (fish oil) 4880-5614 mg daily are helpful to improve this.  Rechecking your fasting cholesterol panel in 6 months is recommended (Lipid w/ LDL reflex, DX: hyperlipidemia)  -TSH (thyroid stimulating hormone) level is normal which indicates normal thyroid function.  -Microalbumin (urine protein) test is normal.  ADVISE: recheck annually.  The 10-year ASCVD risk score (Ton FONTENOT Jr, et al., 2013) is: 4.6%    Values used to calculate the score:      Age: 42 years      Sex: Male      Is Non- : No      Diabetic: Yes      Tobacco smoker: No      Systolic Blood Pressure: 127 mmHg      Is BP treated: Yes      HDL Cholesterol: 50 mg/dL      Total Cholesterol: 218 mg/dL  Desired or goal levels are:  CHOLESTEROL: Desirable is less than 200.   HDL (Good Cholesterol): Desirable is greater than 40 (for men) greater than 50 (for women).  LDL (Bad Cholesterol): Desirable is less than 130 (or less than 100 if you have heart disease or diabetes). Borderline 130-160.  TRIGLYCERIDES: Desirable is less than 150.  Borderline is 150-200.    To help lower your LDL (bad cholesterol) I recommend you start adding ground flax seed to your diet. The recommended dose is 2 heaping tablespoonfuls daily, you may want to start with 1 tablespoonful and increase to 2 heaping tablespoonfuls. You can mix or add ground flax seed to hot cereals, yogurt, applesauce, cottage cheese or any sauce mixture that is  your portion. Ground flax seed can be found in the baking aisle near the flour.      As you may know, an elevated cholesterol is one factor that increases your risk for heart disease and stroke. You can improve your cholesterol by controlling the amount and type of fat you eat and by increasing your daily activity level.    Here are some ways to improve your nutrition:  Eat less fat (especially butter, Crisco and other saturated fats)  Buy lean cuts of meat, reduce your portions of red meat or substitute poultry or fish  Use skim milk and low-fat dairy products  Eat no more than 4 egg yolks per week  Avoid fried or fast foods that are high in fat  Eat more fruits and vegetables      Also consider starting or increasing your aerobic activity. Aerobic activity is the best way to improve HDL (good) cholesterol. If this would be new to you, please talk with me first about what activities are safe for you.